# Patient Record
Sex: MALE | URBAN - METROPOLITAN AREA
[De-identification: names, ages, dates, MRNs, and addresses within clinical notes are randomized per-mention and may not be internally consistent; named-entity substitution may affect disease eponyms.]

---

## 2020-11-09 ENCOUNTER — NURSE TRIAGE (OUTPATIENT)
Dept: NURSING | Facility: CLINIC | Age: 30
End: 2020-11-09

## 2020-11-09 NOTE — TELEPHONE ENCOUNTER
"Caller is a  at [facility].  Caller asks for \"rapid-antigen\" testing resources for patient who will be returning to [place] and is required to have this.  Needs IgG and IgM testing with \"same-day, rapid results.\"    Discussed this type of serologic antibody testing is indeed available through [facility], however results are provided in 7-to-10 business days.  No \"same-day, rapid results.\"    Caller therefore intends to pursue other resources in the [facility] area.    Kylie [facility] Nurse Advisor     Additional Information    Health Information question, no triage required and triager able to answer question    General information question, no triage required and triager able to answer question    Protocols used: INFORMATION ONLY CALL-A-AH      "